# Patient Record
Sex: FEMALE | URBAN - METROPOLITAN AREA
[De-identification: names, ages, dates, MRNs, and addresses within clinical notes are randomized per-mention and may not be internally consistent; named-entity substitution may affect disease eponyms.]

---

## 2020-02-22 ENCOUNTER — ED HISTORICAL/CONVERTED ENCOUNTER (OUTPATIENT)
Dept: OTHER | Age: 25
End: 2020-02-22

## 2022-01-05 ENCOUNTER — OFFICE VISIT (OUTPATIENT)
Dept: ENT CLINIC | Age: 27
End: 2022-01-05
Payer: MEDICARE

## 2022-01-05 VITALS
RESPIRATION RATE: 16 BRPM | BODY MASS INDEX: 46.42 KG/M2 | TEMPERATURE: 98 F | WEIGHT: 262 LBS | HEART RATE: 99 BPM | DIASTOLIC BLOOD PRESSURE: 84 MMHG | HEIGHT: 63 IN | OXYGEN SATURATION: 99 % | SYSTOLIC BLOOD PRESSURE: 130 MMHG

## 2022-01-05 DIAGNOSIS — R09.82 PND (POST-NASAL DRIP): ICD-10-CM

## 2022-01-05 DIAGNOSIS — H92.02 OTALGIA OF LEFT EAR: ICD-10-CM

## 2022-01-05 DIAGNOSIS — J34.3 HYPERTROPHY OF BOTH INFERIOR NASAL TURBINATES: Primary | ICD-10-CM

## 2022-01-05 PROCEDURE — G8419 CALC BMI OUT NRM PARAM NOF/U: HCPCS | Performed by: NURSE PRACTITIONER

## 2022-01-05 PROCEDURE — G8432 DEP SCR NOT DOC, RNG: HCPCS | Performed by: NURSE PRACTITIONER

## 2022-01-05 PROCEDURE — 99203 OFFICE O/P NEW LOW 30 MIN: CPT | Performed by: NURSE PRACTITIONER

## 2022-01-05 PROCEDURE — G8427 DOCREV CUR MEDS BY ELIG CLIN: HCPCS | Performed by: NURSE PRACTITIONER

## 2022-01-05 RX ORDER — FLUTICASONE PROPIONATE 50 MCG
SPRAY, SUSPENSION (ML) NASAL
Qty: 1 EACH | Refills: 0 | Status: SHIPPED | OUTPATIENT
Start: 2022-01-05 | End: 2022-02-04 | Stop reason: SDUPTHER

## 2022-01-05 RX ORDER — FERROUS SULFATE TAB 325 MG (65 MG ELEMENTAL FE) 325 (65 FE) MG
TAB ORAL
COMMUNITY
Start: 2021-12-13

## 2022-01-05 NOTE — PROGRESS NOTES
Otolaryngology-Head and Neck Surgery  New Patient Visit     Patient: Lindsay Swann  YOB: 1995  MRN: [id number]  Date of Service: 1/5/2022    Chief Complaint: Enlarged tonsils    History of Present Illness: Lindsay Swann is a 27y.o. year old female who presents today for discussion of   Reports seeing PCP on 12/5 and on exam having enlarged tonsils. Feeling better at visit today. Currently denies sore throat, fever, difficulty swallowing, denies apnea  +PND, rhinorrhea, left otalgia, snoring    Does not take anything  Never had allergy testing    Hx of chronic strep as child  No ENT surgical HX  No pertinent family Hx    Past Medical History:  History reviewed. No pertinent past medical history. Past Surgical History:   Past Surgical History:   Procedure Laterality Date    HX WISDOM TEETH EXTRACTION         Medications:   Current Outpatient Medications   Medication Instructions    FeroSuL 325 mg (65 mg iron) tablet No dose, route, or frequency recorded. Allergies: Allergies   Allergen Reactions    Milk Containing Products Diarrhea       Social History:   Social History     Tobacco Use    Smoking status: Never Smoker    Smokeless tobacco: Never Used   Vaping Use    Vaping Use: Never used   Substance Use Topics    Alcohol use: Never    Drug use: Never        Family History:  History reviewed. No pertinent family history. Review of Systems:    Consitutional: denies fever, excessive weight gain or loss. Eyes: denies diplopia, eye pain. Integumentary: denies new concerning skin lesions. Ears, Nose, Mouth, Throat: denies except as per HPI.   Endocrine: denies hot or cold intolerance, increased thirst.  Respiratory: denies cough, hemoptysis, wheezing  Gastrointestinal: denies trouble swallowing, nausea, emesis, regurgitation  Musculoskeletal: denies muscle weakness or wasting  Cardiovascular: denies chest pain, shortness of breath  Neurologic: denies seizures, numbness or tingling, syncope  Hematologic: denies easy bleeding or bruising    Physical Examination:   Vitals:    01/05/22 0858   BP: 130/84   BP 1 Location: Left upper arm   BP Patient Position: Sitting   BP Cuff Size: Large adult   Pulse: 99   Temp: 98 °F (36.7 °C)   TempSrc: Temporal   Resp: 16   Height: 5' 3\" (1.6 m)   Weight: 262 lb (118.8 kg)   SpO2: 99%        General: Comfortable, pleasant, appears stated age  Voice: Strong, speaking in full sentences, no stridor    Face: No masses or lesions, facial strength symmetric   Ears: External ears unremarkable. Bilateral ear canal clear with wet appearance. Tympanic membrane clear and intact, with visible landmarks. Clear middle ear space  Nose: External nose unremarkable. Dorsum midline. Anterior rhinoscopy demonstrates no lesions. Septum midline. Turbinates edematous. Oral Cavity / Oropharynx: No trismus. Mucosa pink and moist. No lesions. Tongue is midline and mobile. Palate elevates symmetrically. Uvula midline. Tonsils 2+. Base of tongue soft. Floor of mouth soft. Neck: Supple. No adenopathy. Thyroid unremarkable. Palpable laryngeal landmarks. Full neck range of motion   Neurologic: CN II - XI intact. Normal gait      Assessment and Plan:   1. Hypertrophy of turbinates   2. Left otalgia  3. PND    -Tonsils mildly inflamed on exam today. Likely related to chronic PND.  -Discussed etiologies of ETD. -Will trial Flonase daily.  -Add nasal saline daily.  -Return to office in 4-6 weeks.            Cassandra bAdi MSN, FNP-C  [facility]  [address] [place] [address]  Office Phone: [phone]

## 2022-01-05 NOTE — PROGRESS NOTES
Visit Vitals  /84 (BP 1 Location: Left upper arm, BP Patient Position: Sitting, BP Cuff Size: Large adult)   Pulse 99   Temp 98 °F (36.7 °C) (Temporal)   Resp 16   Ht 5' 3\" (1.6 m)   Wt 262 lb (118.8 kg)   SpO2 99%   BMI 46.41 kg/m²     Chief Complaint   Patient presents with    New Patient     Tonsills were very swollen and sorethroat. Patient states that she is better now.

## 2022-02-04 ENCOUNTER — OFFICE VISIT (OUTPATIENT)
Dept: ENT CLINIC | Age: 27
End: 2022-02-04
Payer: MEDICARE

## 2022-02-04 VITALS
WEIGHT: 263 LBS | RESPIRATION RATE: 16 BRPM | HEART RATE: 97 BPM | TEMPERATURE: 97.2 F | BODY MASS INDEX: 46.6 KG/M2 | SYSTOLIC BLOOD PRESSURE: 138 MMHG | DIASTOLIC BLOOD PRESSURE: 84 MMHG | OXYGEN SATURATION: 98 % | HEIGHT: 63 IN

## 2022-02-04 DIAGNOSIS — R06.83 SNORING: Primary | ICD-10-CM

## 2022-02-04 DIAGNOSIS — J35.1 HYPERTROPHY OF TONSILS: ICD-10-CM

## 2022-02-04 DIAGNOSIS — R09.82 PND (POST-NASAL DRIP): ICD-10-CM

## 2022-02-04 PROCEDURE — G8417 CALC BMI ABV UP PARAM F/U: HCPCS | Performed by: NURSE PRACTITIONER

## 2022-02-04 PROCEDURE — G8432 DEP SCR NOT DOC, RNG: HCPCS | Performed by: NURSE PRACTITIONER

## 2022-02-04 PROCEDURE — G8427 DOCREV CUR MEDS BY ELIG CLIN: HCPCS | Performed by: NURSE PRACTITIONER

## 2022-02-04 PROCEDURE — 99213 OFFICE O/P EST LOW 20 MIN: CPT | Performed by: NURSE PRACTITIONER

## 2022-02-04 RX ORDER — FLUTICASONE PROPIONATE 50 MCG
SPRAY, SUSPENSION (ML) NASAL
Qty: 1 EACH | Refills: 3 | Status: SHIPPED | OUTPATIENT
Start: 2022-02-04

## 2022-02-04 NOTE — PROGRESS NOTES
Otolaryngology-Head and Neck Surgery  Follow Up Patient Visit     Patient: Mell Holder  YOB: 1995  MRN: [id number]  Date of Service:  2/4/2022    Chief Complaint: Enlarged tonsils    History of Present Illness: Mell Holder is a 27y.o. year old female who was last seen 1/5/22 for enlarged tonsils. she presents today for follow up. Seen 1/5/22: Reports seeing PCP on 12/5 and on exam having enlarged tonsils. Feeling better at visit today.    denies sore throat, fever, difficulty swallowing, denies apnea  +PND, rhinorrhea, left otalgia, snoring   Does not take anything  Never had allergy testing  Hx of chronic strep as child    Currently reports PND, rhinorrhea improved  Denies sore throat, difficulty swallowing, voice changes    Using Flonase daily      Past Medical History:  No past medical history on file. Past Surgical History:   Past Surgical History:   Procedure Laterality Date    HX WISDOM TEETH EXTRACTION         Medications:   Current Outpatient Medications   Medication Instructions    FeroSuL 325 mg (65 mg iron) tablet No dose, route, or frequency recorded.  fluticasone propionate (FLONASE) 50 mcg/actuation nasal spray 2 sprays in each nostril daily. Allergies: Allergies   Allergen Reactions    Milk Containing Products Diarrhea       Social History:   Social History     Tobacco Use    Smoking status: Never Smoker    Smokeless tobacco: Never Used   Vaping Use    Vaping Use: Never used   Substance Use Topics    Alcohol use: Never    Drug use: Never       Family History:  No family history on file. Review of Systems:  Consitutional: denies fever, excessive weight gain or loss. Eyes: denies diplopia, eye pain. Integumentary: denies new concerning skin lesions. Ears, Nose, Mouth, Throat: denies except as per HPI.   Endocrine: denies hot or cold intolerance, increased thirst.  Respiratory: denies cough, hemoptysis, wheezing  Gastrointestinal: denies trouble swallowing, nausea, emesis, regurgitation  Musculoskeletal: denies muscle weakness or wasting  Cardiovascular: denies chest pain, shortness of breath  Neurologic: denies seizures, numbness or tingling, syncope  Hematologic: denies easy bleeding or bruising    Physical Examination:   Vitals:    02/04/22 0853   BP: 138/84   BP 1 Location: Left upper arm   BP Patient Position: Sitting   BP Cuff Size: Large adult   Pulse: 97   Temp: 97.2 °F (36.2 °C)   TempSrc: Temporal   Resp: 16   Height: 5' 3\" (1.6 m)   Weight: 263 lb (119.3 kg)   SpO2: 98%         General: Comfortable, pleasant, appears stated age  Voice: Strong, speaking in full sentences, no stridor    Face: No masses or lesions, facial strength symmetric   Ears: External ears unremarkable. Bilateral ear canal clear. Tympanic membrane clear and intact, with visible landmarks. Clear middle ear space  Nose: External nose unremarkable. Dorsum midline. Anterior rhinoscopy demonstrates no lesions. Septum midline. Turbinates edematous. Oral Cavity / Oropharynx: No trismus. Mucosa pink and moist. No lesions. Tongue is midline and mobile. Palate elevates symmetrically. Uvula midline. Tonsils 3-4+. Base of tongue soft. Floor of mouth soft. Neck: Supple. No adenopathy. Thyroid unremarkable. Palpable laryngeal landmarks. Full neck range of motion   Neurologic: CN II - XI intact. Normal gait      Assessment and Plan:   1. Hypertrophy of turbinates   2. Snoring  3. PND    -Continue Flonase daily. RF sent.  -Will send for sleep study. Patient continues to snore and awakening fatigued. -Will consider allergy testing if tonsils continue to be enlarged.  -Discussed surgical options.  -Return to office in 3-4 months.          Nisreen Kohli MSN, FNP-C  [facility]  [address] [place] [address] 6  [facility]  Office Phone: [phone]

## 2022-02-04 NOTE — PROGRESS NOTES
Visit Vitals  /84 (BP 1 Location: Left upper arm, BP Patient Position: Sitting, BP Cuff Size: Large adult)   Pulse 97   Temp 97.2 °F (36.2 °C) (Temporal)   Resp 16   Ht 5' 3\" (1.6 m)   Wt 263 lb (119.3 kg)   SpO2 98%   BMI 46.59 kg/m²     Chief Complaint   Patient presents with    Follow-up     Recheck Hypertrophy of bilateral inferior nasal turbinates / PND and left ear pain

## 2023-05-22 RX ORDER — FERROUS SULFATE 325(65) MG
TABLET ORAL
COMMUNITY
Start: 2021-12-13

## 2023-05-22 RX ORDER — FLUTICASONE PROPIONATE 50 MCG
SPRAY, SUSPENSION (ML) NASAL
COMMUNITY
Start: 2022-02-04